# Patient Record
Sex: MALE | URBAN - METROPOLITAN AREA
[De-identification: names, ages, dates, MRNs, and addresses within clinical notes are randomized per-mention and may not be internally consistent; named-entity substitution may affect disease eponyms.]

---

## 2018-11-16 ENCOUNTER — OFFICE VISIT (OUTPATIENT)
Dept: URBAN - METROPOLITAN AREA CLINIC 11 | Facility: CLINIC | Age: 83
End: 2018-11-16
Payer: MEDICARE

## 2018-11-16 DIAGNOSIS — H40.1133 PRIMARY OPEN-ANGLE GLAUCOMA, BILATERAL, SEVERE STAGE: Primary | ICD-10-CM

## 2018-11-16 PROCEDURE — 99213 OFFICE O/P EST LOW 20 MIN: CPT | Performed by: OPTOMETRIST

## 2018-11-16 ASSESSMENT — INTRAOCULAR PRESSURE
OS: 10
OD: 9

## 2018-11-16 NOTE — IMPRESSION/PLAN
Impression: Diagnosis: Primary open-angle glaucoma, bilateral, severe stage. Code: S12.2854. /577   stable IOP OU Plan: Continue Alphagan q12h OU, continue Latanoprost QHS OU.  Will monitor IOP
orig IOP 16/16lat, [facility] IOP 12/16latalph, OCT 56??/